# Patient Record
Sex: MALE | Race: WHITE | URBAN - METROPOLITAN AREA
[De-identification: names, ages, dates, MRNs, and addresses within clinical notes are randomized per-mention and may not be internally consistent; named-entity substitution may affect disease eponyms.]

---

## 2017-01-06 ENCOUNTER — IMPORTED ENCOUNTER (OUTPATIENT)
Dept: URBAN - METROPOLITAN AREA CLINIC 50 | Facility: CLINIC | Age: 72
End: 2017-01-06

## 2017-01-20 ENCOUNTER — IMPORTED ENCOUNTER (OUTPATIENT)
Dept: URBAN - METROPOLITAN AREA CLINIC 50 | Facility: CLINIC | Age: 72
End: 2017-01-20

## 2017-01-31 ENCOUNTER — IMPORTED ENCOUNTER (OUTPATIENT)
Dept: URBAN - METROPOLITAN AREA CLINIC 50 | Facility: CLINIC | Age: 72
End: 2017-01-31

## 2017-02-06 ENCOUNTER — IMPORTED ENCOUNTER (OUTPATIENT)
Dept: URBAN - METROPOLITAN AREA CLINIC 50 | Facility: CLINIC | Age: 72
End: 2017-02-06

## 2017-02-20 ENCOUNTER — IMPORTED ENCOUNTER (OUTPATIENT)
Dept: URBAN - METROPOLITAN AREA CLINIC 50 | Facility: CLINIC | Age: 72
End: 2017-02-20

## 2017-04-18 ENCOUNTER — IMPORTED ENCOUNTER (OUTPATIENT)
Dept: URBAN - METROPOLITAN AREA CLINIC 50 | Facility: CLINIC | Age: 72
End: 2017-04-18

## 2017-07-10 ENCOUNTER — IMPORTED ENCOUNTER (OUTPATIENT)
Dept: URBAN - METROPOLITAN AREA CLINIC 50 | Facility: CLINIC | Age: 72
End: 2017-07-10

## 2021-04-17 ASSESSMENT — VISUAL ACUITY
OD_CC: J16@ 12 IN
OS_SC: CF@2'
OS_CC: J16@ 12 IN
OS_CC: CF @ 3'
OD_CC: 20/100
OD_SC: 20/70-1
OD_CC: 20/200
OD_SC: 20/200
OS_CC: CF@3FT
OD_PH: 20/60-

## 2021-04-17 ASSESSMENT — TONOMETRY
OS_IOP_MMHG: 14
OS_IOP_MMHG: 9
OD_IOP_MMHG: 12
OD_IOP_MMHG: 13

## 2021-05-17 NOTE — PATIENT DISCUSSION
New Prescription: neomycin-polymyxin-dexameth (neomycin-polymyxin-dexameth): ointment: 3.5-10,000-0.1 mg-unit/g-% a small amount twice a day as directed on eyelid 05-

## 2021-05-17 NOTE — PATIENT DISCUSSION
CHALAZION LLL: I have explained to the patient that a chalazion is a mass associated with inflammation, obstruction, or retained secretions of one or more of the glands that lubricates the edge of the eyelids. Symptoms may include pain, inflammation, and drainage of the mass. This may resolve on its own or may require excision. I have discussed the options of removal versus following. The risks, benefits, alternatives include anesthesia, bleeding, infection, inflammation, swelling, bruising and scarring. The patient understands and desires to remove Chalazion-LLL. Gave patient RX for Erythromycin. Apply to affected area twice a day for 1 week.

## 2021-06-17 NOTE — PATIENT DISCUSSION
CATARACTS, OU - VISION NOT AT POTENTIAL. WILL TRY GLASSES AND USING ARTIFICIAL TEARS AND CHECK BACK IN 6 WEEKS.  IF NO IMPROVEMENT CONSIDER SURGERY